# Patient Record
Sex: FEMALE | Employment: FULL TIME | URBAN - METROPOLITAN AREA
[De-identification: names, ages, dates, MRNs, and addresses within clinical notes are randomized per-mention and may not be internally consistent; named-entity substitution may affect disease eponyms.]

---

## 2017-02-24 ENCOUNTER — OFFICE VISIT (OUTPATIENT)
Dept: FAMILY MEDICINE CLINIC | Age: 22
End: 2017-02-24

## 2017-02-24 VITALS
SYSTOLIC BLOOD PRESSURE: 132 MMHG | WEIGHT: 210 LBS | HEIGHT: 69 IN | TEMPERATURE: 98.8 F | BODY MASS INDEX: 31.1 KG/M2 | HEART RATE: 68 BPM | DIASTOLIC BLOOD PRESSURE: 85 MMHG

## 2017-02-24 DIAGNOSIS — F31.9 BIPOLAR AFFECTIVE DISORDER, REMISSION STATUS UNSPECIFIED (HCC): ICD-10-CM

## 2017-02-24 DIAGNOSIS — J31.0 RHINOSINUSITIS: Primary | ICD-10-CM

## 2017-02-24 DIAGNOSIS — J32.9 RHINOSINUSITIS: Primary | ICD-10-CM

## 2017-02-24 DIAGNOSIS — H65.92 OME (OTITIS MEDIA WITH EFFUSION), LEFT: ICD-10-CM

## 2017-02-24 DIAGNOSIS — J02.9 SORE THROAT: ICD-10-CM

## 2017-02-24 LAB
S PYO AG THROAT QL: NEGATIVE
VALID INTERNAL CONTROL?: YES

## 2017-02-25 NOTE — PROGRESS NOTES
Hebert Gottron is a 22 y.o.  female and presents with    Chief Complaint   Patient presents with    Ear Pain    Sore Throat       Subjective:  Sore Throat    The history is provided by the patient. This is a new problem. The current episode started more than 2 days ago. The problem has not changed since onset. There has been no fever. Associated symptoms include congestion, ear pain and plugged ear sensation. Pertinent negatives include no ear discharge, no headaches, no shortness of breath, no stridor, no trouble swallowing and no stiff neck. She has tried NSAIDs for the symptoms. The treatment provided moderate relief. She wants to make sure she doesn't have strep throat. The pain is worse on the left side, and in the morning. She sleeps on her left side. Additional Concerns: She reports being diagnosed with bipolar 7 months ago, and beginning meds, but then stopping after 5 days. She continued with a therapist but has since stopped. She says she feels like she may be going into a manic phase as she needs less and less sleep (4 hours a night presently). There is no problem list on file for this patient. There are no active problems to display for this patient. Allergies   Allergen Reactions    Albuterol Not Reported This Time   Napolean Caesar Flavor Not Reported This Time     Past Medical History:   Diagnosis Date    Bipolar 1 disorder ([facility] [address]      No past surgical history on file. No family history on file. Social History   Substance Use Topics    Smoking status: Former Smoker     Quit date: 2014    Smokeless tobacco: Not on file    Alcohol use Not on file       ROS   Review of Systems   Constitutional: Negative for chills, fever and malaise/fatigue. HENT: Positive for congestion, ear pain and sore throat. Negative for ear discharge and trouble swallowing. Eyes: Negative for blurred vision. Respiratory: Negative for shortness of breath and stridor.     Cardiovascular: Negative for chest pain. Gastrointestinal: Negative for nausea. Musculoskeletal: Negative for myalgias. Skin: Negative for rash. Neurological: Positive for dizziness. Negative for headaches. Psychiatric/Behavioral: Positive for substance abuse. Negative for hallucinations and suicidal ideas. All other systems reviewed and are negative. Objective:  Vitals:    02/24/17 1630   BP: 132/85   Pulse: 68   Temp: 98.8 °F (37.1 °C)   Weight: 210 lb (95.3 kg)   Height: 5' 8.5\" (1.74 m)   PainSc:   2   PainLoc: Throat     Physical Exam   Constitutional: She is oriented to person, place, and time. She appears well-developed and well-nourished. No distress. HENT:   Head: Normocephalic and atraumatic. Right Ear: Tympanic membrane and external ear normal.   Left Ear: External ear normal. Tympanic membrane is not erythematous and not bulging. A middle ear effusion is present. Nose: Rhinorrhea present. Mouth/Throat: Uvula is midline. Posterior oropharyngeal erythema present. No oropharyngeal exudate or tonsillar abscesses. Eyes: Conjunctivae are normal. Pupils are equal, round, and reactive to light. Neck: Normal range of motion. Neck supple. Cardiovascular: Normal rate, regular rhythm and normal heart sounds. Pulmonary/Chest: Effort normal and breath sounds normal.   Abdominal: Soft. Lymphadenopathy:     She has no cervical adenopathy. Neurological: She is alert and oriented to person, place, and time. Skin: Skin is warm and dry. No rash noted. Vitals reviewed. LABS   Rapid strep test negative. Assessment/Plan:    1. Rhinosinusitis  Sinus rinses, flonase, claritin as desired. Warm salt water gargles prn.     2. OME (otitis media with effusion), left  Advised it can take a few weeks to resolve. 3. Bipolar affective disorder, remission status unspecified ([facility] [address]  Talked with patient regarding resuming therapy at very minimum, and to revisit starting meds.  I explained it may take a few tries before finding the right med combination. Patient agreed to strongly consider getting back into therapy. Patient to call or return to office if symptoms worsen or fail to improve after 7 days. I have discussed the diagnosis with the patient and the intended plan as seen in the above orders. The patient has received an after-visit summary and questions were answered concerning future plans. I have discussed medication side effects and warnings with the patient as well. I have reviewed the plan of care with the patient, accepted their input and they are in agreement with the treatment goals. More than 1/2 of this 30 minute visit was spent in counselling and coordination of care, as described above.

## 2017-02-25 NOTE — PATIENT INSTRUCTIONS
Saline Nasal Washes: Care Instructions  Your Care Instructions  Saline nasal washes help keep the nasal passages open by washing out thick or dried mucus. This simple remedy can help relieve symptoms of allergies, sinusitis, and colds. It also can make the nose feel more comfortable by keeping the mucous membranes moist. You may notice a little burning sensation in your nose the first few times you use the solution, but this usually gets better in a few days. Follow-up care is a key part of your treatment and safety. Be sure to make and go to all appointments, and call your doctor if you are having problems. It's also a good idea to know your test results and keep a list of the medicines you take. How can you care for yourself at home? · You can buy premixed saline solution in a squeeze bottle or other sinus rinse products at a drugstore. Read and follow the instructions on the label. · You also can make your own saline solution by adding 1 teaspoon of salt and 1 teaspoon of baking soda to 2 cups of distilled water. · If you use a homemade solution, pour a small amount into a clean bowl. Using a rubber bulb syringe, squeeze the syringe and place the tip in the salt water. Pull a small amount of the salt water into the syringe by relaxing your hand. · Sit down with your head tilted slightly back. Do not lie down. Put the tip of the bulb syringe or the squeeze bottle a little way into one of your nostrils. Gently drip or squirt a few drops into the nostril. Repeat with the other nostril. Some sneezing and gagging are normal at first.  · Gently blow your nose. · Wipe the syringe or bottle tip clean after each use. · Repeat this 2 or 3 times a day. · Use nasal washes gently if you have nosebleeds often. When should you call for help? Watch closely for changes in your health, and be sure to contact your doctor if:  · You often get nosebleeds. · You have problems doing the nasal washes.   Where can you learn more? Go to http://maritza-lou.info/. Enter [id number] in the search box to learn more about \"Saline Nasal Washes: Care Instructions. \"  Current as of: July 29, 2016  Content Version: 11.1  © 2360-7311 [employer]. Care instructions adapted under license by [employer] (which disclaims liability or warranty for this information). If you have questions about a medical condition or this instruction, always ask your healthcare professional. [address] any warranty or liability for your use of this information.